# Patient Record
Sex: MALE | Race: BLACK OR AFRICAN AMERICAN | URBAN - METROPOLITAN AREA
[De-identification: names, ages, dates, MRNs, and addresses within clinical notes are randomized per-mention and may not be internally consistent; named-entity substitution may affect disease eponyms.]

---

## 2017-01-18 ENCOUNTER — ANTICOAGULATION THERAPY VISIT (OUTPATIENT)
Dept: NURSING | Facility: CLINIC | Age: 62
End: 2017-01-18
Payer: COMMERCIAL

## 2017-01-18 ENCOUNTER — OFFICE VISIT (OUTPATIENT)
Dept: GASTROENTEROLOGY | Facility: CLINIC | Age: 62
End: 2017-01-18
Attending: PHYSICIAN ASSISTANT
Payer: COMMERCIAL

## 2017-01-18 VITALS
TEMPERATURE: 98.2 F | BODY MASS INDEX: 32.95 KG/M2 | OXYGEN SATURATION: 95 % | DIASTOLIC BLOOD PRESSURE: 91 MMHG | HEART RATE: 77 BPM | SYSTOLIC BLOOD PRESSURE: 145 MMHG | WEIGHT: 243.3 LBS | HEIGHT: 72 IN

## 2017-01-18 DIAGNOSIS — B18.2 CHRONIC HEPATITIS C WITHOUT HEPATIC COMA (H): ICD-10-CM

## 2017-01-18 DIAGNOSIS — I48.91 ATRIAL FIBRILLATION, UNSPECIFIED TYPE (H): ICD-10-CM

## 2017-01-18 DIAGNOSIS — Z79.01 LONG-TERM (CURRENT) USE OF ANTICOAGULANTS: ICD-10-CM

## 2017-01-18 DIAGNOSIS — B18.2 CHRONIC HEPATITIS C WITHOUT HEPATIC COMA (H): Primary | ICD-10-CM

## 2017-01-18 LAB
ALBUMIN SERPL-MCNC: 3.6 G/DL (ref 3.4–5)
ALP SERPL-CCNC: 72 U/L (ref 40–150)
ALT SERPL W P-5'-P-CCNC: 81 U/L (ref 0–70)
ANION GAP SERPL CALCULATED.3IONS-SCNC: 8 MMOL/L (ref 3–14)
AST SERPL W P-5'-P-CCNC: 62 U/L (ref 0–45)
BILIRUB DIRECT SERPL-MCNC: 0.4 MG/DL (ref 0–0.2)
BILIRUB SERPL-MCNC: 1.4 MG/DL (ref 0.2–1.3)
BUN SERPL-MCNC: 12 MG/DL (ref 7–30)
CALCIUM SERPL-MCNC: 8.8 MG/DL (ref 8.5–10.1)
CHLORIDE SERPL-SCNC: 106 MMOL/L (ref 94–109)
CO2 SERPL-SCNC: 28 MMOL/L (ref 20–32)
CREAT SERPL-MCNC: 0.78 MG/DL (ref 0.66–1.25)
ERYTHROCYTE [DISTWIDTH] IN BLOOD BY AUTOMATED COUNT: 12 % (ref 10–15)
GFR SERPL CREATININE-BSD FRML MDRD: NORMAL ML/MIN/1.7M2
GLUCOSE SERPL-MCNC: 84 MG/DL (ref 70–99)
HCT VFR BLD AUTO: 46.5 % (ref 40–53)
HGB BLD-MCNC: 16.3 G/DL (ref 13.3–17.7)
INR POINT OF CARE: 1.3 (ref 2–3)
INR PPP: 1.24 (ref 0.86–1.14)
MCH RBC QN AUTO: 35.1 PG (ref 26.5–33)
MCHC RBC AUTO-ENTMCNC: 35.1 G/DL (ref 31.5–36.5)
MCV RBC AUTO: 100 FL (ref 78–100)
PLATELET # BLD AUTO: 221 10E9/L (ref 150–450)
POTASSIUM SERPL-SCNC: 3.9 MMOL/L (ref 3.4–5.3)
PROT SERPL-MCNC: 7.2 G/DL (ref 6.8–8.8)
RBC # BLD AUTO: 4.64 10E12/L (ref 4.4–5.9)
SODIUM SERPL-SCNC: 143 MMOL/L (ref 133–144)
WBC # BLD AUTO: 6.3 10E9/L (ref 4–11)

## 2017-01-18 PROCEDURE — 99207 ZZC NO CHARGE NURSE ONLY: CPT | Performed by: FAMILY MEDICINE

## 2017-01-18 PROCEDURE — 85610 PROTHROMBIN TIME: CPT | Performed by: FAMILY MEDICINE

## 2017-01-18 PROCEDURE — 87522 HEPATITIS C REVRS TRNSCRPJ: CPT | Performed by: FAMILY MEDICINE

## 2017-01-18 PROCEDURE — 80076 HEPATIC FUNCTION PANEL: CPT | Performed by: FAMILY MEDICINE

## 2017-01-18 PROCEDURE — 85610 PROTHROMBIN TIME: CPT | Mod: QW | Performed by: FAMILY MEDICINE

## 2017-01-18 PROCEDURE — 85027 COMPLETE CBC AUTOMATED: CPT | Performed by: FAMILY MEDICINE

## 2017-01-18 PROCEDURE — 99212 OFFICE O/P EST SF 10 MIN: CPT | Mod: ZF

## 2017-01-18 PROCEDURE — 36415 COLL VENOUS BLD VENIPUNCTURE: CPT | Performed by: FAMILY MEDICINE

## 2017-01-18 PROCEDURE — 80048 BASIC METABOLIC PNL TOTAL CA: CPT | Performed by: FAMILY MEDICINE

## 2017-01-18 ASSESSMENT — PAIN SCALES - GENERAL: PAINLEVEL: NO PAIN (0)

## 2017-01-18 NOTE — PROGRESS NOTES
Call to patient x2 without answer left lengthy message regarding his warfarin dosage.Asked that patient call back if receives message.  ANTICOAGULATION FOLLOW-UP CLINIC VISIT    Patient Name:  Maximus Josue  Date:  1/18/2017  Contact Type:  Telephone    SUBJECTIVE:     Patient Findings     Positives No Problem Findings           OBJECTIVE    INR   Date Value Ref Range Status   01/18/2017 1.24* 0.86 - 1.14 Final       ASSESSMENT / PLAN  INR assessment SUB    Recheck INR In: 2 WEEKS    INR Location Clinic      Anticoagulation Summary as of 1/18/2017     INR goal 2.0-3.0   Selected INR 1.3! (1/18/2017)   Maintenance plan 7.5 mg (5 mg x 1.5) on Mon, Fri; 5 mg (5 mg x 1) all other days   Full instructions 1/18: 10 mg; Otherwise 7.5 mg on Mon, Fri; 5 mg all other days   Weekly total 40 mg   Plan last modified Donna Fowler RN (8/25/2016)   Next INR check 2/1/2017   Target end date Indefinite    Indications   Atrial fibrillation (H) [I48.91]  Long-term (current) use of anticoagulants [Z79.01] [Z79.01]         Anticoagulation Episode Summary     INR check location [facility]    Preferred lab     Send INR reminders to [facility] INR/PROTIME    Comments       Anticoagulation Care Providers     Provider Role Specialty Phone number    Handy Hollis MD [facility] [phone]            See the Encounter Report to view Anticoagulation Flowsheet and Dosing Calendar (Go to Encounters tab in chart review, and find the Anticoagulation Therapy Visit)        Pia Breaux RN

## 2017-01-18 NOTE — Clinical Note
"1/18/2017       RE: Maximus Jsoue  [address]  [facility] [address]     Dear Colleague,    Thank you for referring your patient, Maximus Josue, to the [facility] HEPATOLOGY at [facility]. Please see a copy of my visit note below.    Division of Gastroenterology, Hepatology and Nutrition Department of Medicine     Assessment/Plan  Chronic Hepatitis C,  genotype 1b     AFIB    Maximus Josue has chronic hepatitis C, genotype 1b.  His labs today show the following:  ALT 81, AST 62, Cr 0.78, T. Bili 1.4(mainly indirect), Na 143, Albumin 3.6 and PLTs of 221 so he does have normal liver synthetic function.  He is on Coumadin, so the INR is elevated.  Of concern is compliance.  He has a 40% No Show rate and has not followed up for 2 years.  He says, \"I am very focused now.\"  I have ordered an MRI and MR elastography and he will schedule these.  I will see him back in 2 months to discuss the results and if he is complaint with his appointments, we will start the Hepatitis C treatment process. He has agreed to this plan.    Thank you for allowing me to participate in the care of this patient.  If you have any questions regarding this visit and plan of care, please do not hesitate to page me at [phone].    Roz Ge MS, PA-C  Division of Gastroenterology, Hepatology and Nutrition      HPI  Maximus Josue is a pleasant 61 year old -American male with chronic hepatitis C, genotype 1b.  I had last seen him in clinic 5 years ago.  He was supposed to have had imaging and follow-up at that time.  He does have a 40% No Show Rate.   He says he is ready to focus now on making his appointments.  He was diagnosed 5 years ago with HCV.  His risk factors for hepatitis C are: intranasal cocaine and incarceration in the 1970's.   He has  undergone a full  Evaluation for this through [facility].   He has had a liver biopsy through [facility] about 5 years ago and doesn't recall the " results.   He  is  treatment naive. He does still drink alcohol about 4 beers in a sitting a few times per week.  This patient also has the following significant past medical history:   Patient Active Problem List   Diagnosis     Hypertension goal BP (blood pressure) < 140/80     Atrial fibrillation (H)     Hyperlipidemia with target LDL less than 100     Long term current use of anticoagulant therapy     Long-term (current) use of anticoagulants [Z79.01]     ACP (advance care planning)     Nasal bleeding     Chronic hepatitis C without hepatic coma (H)   He feels very well today with no symptoms to report.    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        ROS:  Comprehensive review of systems is negative, unless otherwise noted above    History reviewed. No pertinent past medical history.    Current Outpatient Prescriptions   Medication Sig Dispense Refill     bisoprolol-hydrochlorothiazide (ZIAC) 10-6.25 MG per tablet Take 1 tablet by mouth daily 90 tablet 3     warfarin (COUMADIN) 5 MG tablet Take 1 tablet (5 mg) by mouth every other day (Patient taking differently: Take 5 mg by mouth every other day 7.5 mon, fri & 5 row) 90 tablet 3     warfarin (COUMADIN) 3 MG tablet Take 1 tablet (3 mg) by mouth every other day 15 tablet 0       No Known Allergies    Family History   Problem Relation Age of Onset     Breast Cancer Mother      Prostate Cancer Father      Cancer - colorectal Sister 52     Colon Cancer     Breast Cancer Sister      Breast Cancer Sister        Social History     Social History     Marital Status:      Spouse Name: N/A     Number of Children: N/A     Years of Education: N/A     Social History Main Topics     Smoking status: Never Smoker      Smokeless tobacco: Never Used     Alcohol Use: Yes      Comment: Quit drinking in January 2015     Drug Use: No     Sexual Activity:     Partners: Female     Other Topics Concern     Parent/Sibling W/ Cabg, Mi Or  Angioplasty Before 65f 55m? No     Social History Narrative       OBJECTIVE  Vitals: Blood pressure 145/91, pulse 77, temperature 98.2  F (36.8  C), temperature source Oral, height 1.829 m (6'), weight 110.36 kg (243 lb 4.8 oz), SpO2 95 %. Body mass index is 32.99 kg/(m^2).  Gen: No acute distress, obese  Eyes: Sclera anicteric  Respiratory: Clear to auscultation bilaterally, no overt wheezing or rales  CV: RRR without overt murmur  Abdomen:  Soft, nontender, nondistended, normal bowel sounds  Without appreciable hepatosplenomegaly or masses   Skin: No rash; no jaundice  Psych: Normal speech, normal affect    Recent Laboratory Test Results  Lab Results   Component Value Date    WBC 6.3 01/18/2017    HGB 16.3 01/18/2017    HCT 46.5 01/18/2017     01/18/2017    CHOL 172 06/10/2016    TRIG 106 06/10/2016    HDL 87 06/10/2016    ALT 81* 01/18/2017    AST 62* 01/18/2017     01/18/2017    BUN 12 01/18/2017    CO2 28 01/18/2017    TSH 1.95 01/24/2013    PSA 0.62 02/26/2015    INR 1.24* 01/18/2017       Again, thank you for allowing me to participate in the care of your patient.      Sincerely,    Roz Ge PA-C

## 2017-01-18 NOTE — PROGRESS NOTES
"Division of Gastroenterology, Hepatology and Nutrition Department of Medicine     Assessment/Plan  Chronic Hepatitis C,  genotype 1b     AFIB    Maximus Josue has chronic hepatitis C, genotype 1b.  His labs today show the following:  ALT 81, AST 62, Cr 0.78, T. Bili 1.4(mainly indirect), Na 143, Albumin 3.6 and PLTs of 221 so he does have normal liver synthetic function.  He is on Coumadin, so the INR is elevated.  Of concern is compliance.  He has a 40% No Show rate and has not followed up for 2 years.  He says, \"I am very focused now.\"  I have ordered an MRI and MR elastography and he will schedule these.  I will see him back in 2 months to discuss the results and if he is complaint with his appointments, we will start the Hepatitis C treatment process. He has agreed to this plan.    Thank you for allowing me to participate in the care of this patient.  If you have any questions regarding this visit and plan of care, please do not hesitate to page me at [phone].    Roz Ge MS, PA-C  Division of Gastroenterology, Hepatology and Nutrition      [facility] [place]  Maximus Josue is a pleasant 61 year old -American male with chronic hepatitis C, genotype 1b.  I had last seen him in clinic 5 years ago.  He was supposed to have had imaging and follow-up at that time.  He does have a 40% No Show Rate.   He says he is ready to focus now on making his appointments.  He was diagnosed 5 years ago with HCV.  His risk factors for hepatitis C are: intranasal cocaine and incarceration in the 1970's.   He has  undergone a full  Evaluation for this through [facility].   He has had a liver biopsy through [facility] about 5 years ago and doesn't recall the results.   He  is  treatment naive. He does still drink alcohol about 4 beers in a sitting a few times per week.  This patient also has the following significant past medical history:   Patient Active Problem List   Diagnosis     Hypertension goal BP (blood pressure) < 140/80     " Atrial fibrillation (H)     Hyperlipidemia with target LDL less than 100     Long term current use of anticoagulant therapy     Long-term (current) use of anticoagulants [Z79.01]     ACP (advance care planning)     Nasal bleeding     Chronic hepatitis C without hepatic coma (H)   He feels very well today with no symptoms to report.    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        ROS:  Comprehensive review of systems is negative, unless otherwise noted above    History reviewed. No pertinent past medical history.    Current Outpatient Prescriptions   Medication Sig Dispense Refill     bisoprolol-hydrochlorothiazide (ZIAC) 10-6.25 MG per tablet Take 1 tablet by mouth daily 90 tablet 3     warfarin (COUMADIN) 5 MG tablet Take 1 tablet (5 mg) by mouth every other day (Patient taking differently: Take 5 mg by mouth every other day 7.5 mon, fri & 5 row) 90 tablet 3     warfarin (COUMADIN) 3 MG tablet Take 1 tablet (3 mg) by mouth every other day 15 tablet 0       No Known Allergies    Family History   Problem Relation Age of Onset     Breast Cancer Mother      Prostate Cancer Father      Cancer - colorectal Sister 52     Colon Cancer     Breast Cancer Sister      Breast Cancer Sister        Social History     Social History     Marital Status:      Spouse Name: N/A     Number of Children: N/A     Years of Education: N/A     Social History Main Topics     Smoking status: Never Smoker      Smokeless tobacco: Never Used     Alcohol Use: Yes      Comment: Quit drinking in January 2015     Drug Use: No     Sexual Activity:     Partners: Female     Other Topics Concern     Parent/Sibling W/ Cabg, Mi Or Angioplasty Before 65f 55m? No     Social History Narrative       OBJECTIVE  Vitals: Blood pressure 145/91, pulse 77, temperature 98.2  F (36.8  C), temperature source Oral, height 1.829 m (6'), weight 110.36 kg (243 lb 4.8 oz), SpO2 95 %. Body mass index is 32.99 kg/(m^2).  Gen: No acute  distress, obese  Eyes: Sclera anicteric  Respiratory: Clear to auscultation bilaterally, no overt wheezing or rales  CV: RRR without overt murmur  Abdomen:  Soft, nontender, nondistended, normal bowel sounds  Without appreciable hepatosplenomegaly or masses   Skin: No rash; no jaundice  Psych: Normal speech, normal affect    Recent Laboratory Test Results  Lab Results   Component Value Date    WBC 6.3 01/18/2017    HGB 16.3 01/18/2017    HCT 46.5 01/18/2017     01/18/2017    CHOL 172 06/10/2016    TRIG 106 06/10/2016    HDL 87 06/10/2016    ALT 81* 01/18/2017    AST 62* 01/18/2017     01/18/2017    BUN 12 01/18/2017    CO2 28 01/18/2017    TSH 1.95 01/24/2013    PSA 0.62 02/26/2015    INR 1.24* 01/18/2017

## 2017-01-18 NOTE — NURSING NOTE
Chief Complaint   Patient presents with     Consult     Hepatitis C   Pt roomed, vitals, meds, and allergies reviewed with pt. Pt ready for provider.  Alberto Haider, CMA

## 2017-01-19 LAB
HCV RNA SERPL NAA+PROBE-ACNC: ABNORMAL [IU]/ML
HCV RNA SERPL NAA+PROBE-LOG IU: 6.4 LOG IU/ML

## 2017-03-07 ENCOUNTER — TELEPHONE (OUTPATIENT)
Dept: GASTROENTEROLOGY | Facility: CLINIC | Age: 62
End: 2017-03-07

## 2017-03-07 NOTE — TELEPHONE ENCOUNTER
Called patient and left a message to ask him to make the appointment with Roz one week after us and elastography.  Alberto Haider, CMA

## 2017-03-15 ENCOUNTER — ANTICOAGULATION THERAPY VISIT (OUTPATIENT)
Dept: NURSING | Facility: CLINIC | Age: 62
End: 2017-03-15
Payer: COMMERCIAL

## 2017-03-15 DIAGNOSIS — I48.91 ATRIAL FIBRILLATION, UNSPECIFIED TYPE (H): ICD-10-CM

## 2017-03-15 DIAGNOSIS — Z79.01 LONG-TERM (CURRENT) USE OF ANTICOAGULANTS: ICD-10-CM

## 2017-03-15 DIAGNOSIS — I48.91 ATRIAL FIBRILLATION (H): ICD-10-CM

## 2017-03-15 LAB
INR POINT OF CARE: 1.41 (ref 2–3)
INR PPP: 1.41 (ref 0.86–1.14)

## 2017-03-15 PROCEDURE — 99207 ZZC NO CHARGE NURSE ONLY: CPT | Performed by: FAMILY MEDICINE

## 2017-03-15 NOTE — PROGRESS NOTES
ANTICOAGULATION FOLLOW-UP CLINIC VISIT    Patient Name:  Maximus Josue  Date:  3/15/2017  Contact Type:  Telephone    SUBJECTIVE:     Patient Findings     Positives Other complaints, Unexplained INR or factor level change    Comments Unknown reason for patient's INR except noncompliance.Pt does not return calls does not come into INR clinic have no idea what he is taking and what his circumstances may be at this time.           OBJECTIVE    INR   Date Value Ref Range Status   03/15/2017 1.41 (H) 0.86 - 1.14 Final       ASSESSMENT / PLAN  INR assessment SUB    Recheck INR In: 2 WEEKS    INR Location Clinic      Anticoagulation Summary as of 3/15/2017     INR goal 2.0-3.0   Today's INR 1.41!   Maintenance plan 7.5 mg (5 mg x 1.5) on Mon, Fri; 5 mg (5 mg x 1) all other days   Full instructions 3/15: 7.5 mg; 3/16: 7.5 mg; Otherwise 7.5 mg on Mon, Fri; 5 mg all other days   Weekly total 40 mg   Plan last modified Donna Fowler RN (8/25/2016)   Next INR check 3/28/2017   Target end date Indefinite    Indications   Atrial fibrillation (H) [I48.91]  Long-term (current) use of anticoagulants [Z79.01] [Z79.01]         Anticoagulation Episode Summary     INR check location [facility]    Preferred lab     Send INR reminders to [facility] INR/PROTIME    Comments       Anticoagulation Care Providers     Provider Role Specialty Phone number    Handy Hollis MD [facility] [phone]            See the Encounter Report to view Anticoagulation Flowsheet and Dosing Calendar (Go to Encounters tab in chart review, and find the Anticoagulation Therapy Visit)        Pia Breaux RN

## 2017-03-15 NOTE — PROGRESS NOTES
Patient called back and was reinstructed on coumadin dosing and appointment scheduled for follow up of INR.

## 2017-03-15 NOTE — MR AVS SNAPSHOT
Maximus Josue   3/15/2017   Anticoagulation Therapy Visit    Description:  61 year old male   Provider:  Handy Hollis MD   Department:  Bx Nurse           INR as of 3/15/2017     Today's INR 1.41!      Anticoagulation Summary as of 3/15/2017     INR goal 2.0-3.0   Today's INR 1.41!   Full instructions 3/15: 7.5 mg; 3/16: 7.5 mg; Otherwise 7.5 mg on Mon, Fri; 5 mg all other days   Next INR check 3/28/2017    Indications   Atrial fibrillation (H) [I48.91]  Long-term (current) use of anticoagulants [Z79.01] [Z79.01]         Your next Anticoagulation Clinic appointment(s)     Mar 28, 2017  8:15 AM CDT   Anticoagulation Visit with [facility]   [facility]    [address] [place] [id number] [phone]              Contact Numbers     [facility]  Please call  [phone] to cancel and/or reschedule your appointment   The direct line to the anticoagulant nurse is [phone] on Monday, Wednesday, and Friday. On Thursday, the anticoagulant nurse can be reached directly at [phone].         March 2017 Details    Sun Mon Tue Wed Thu Fri Sat        1               2               3               4                 5               6               7               8               9               10               11                 12               13               14               15      7.5 mg   See details      16      7.5 mg         17      7.5 mg         18      5 mg           19      5 mg         20      7.5 mg         21      5 mg         22      5 mg         23      5 mg         24      7.5 mg         25      5 mg           26      5 mg         27      7.5 mg         28            29               30               31                 Date Details   03/15 This INR check       Date of next INR:  3/28/2017         How to take your warfarin dose     To take:  5 mg Take 1 of  the 5 mg tablets.    To take:  7.5 mg Take 1.5 of the 5 mg tablets.

## 2017-03-30 ENCOUNTER — TELEPHONE (OUTPATIENT)
Dept: NURSING | Facility: CLINIC | Age: 62
End: 2017-03-30

## 2017-03-30 DIAGNOSIS — Z79.01 LONG TERM CURRENT USE OF ANTICOAGULANT THERAPY: Primary | ICD-10-CM

## 2017-04-28 ENCOUNTER — TELEPHONE (OUTPATIENT)
Dept: NURSING | Facility: CLINIC | Age: 62
End: 2017-04-28

## 2017-04-28 NOTE — TELEPHONE ENCOUNTER
[facility] called and refill on his coumadin canceled.  Pt must have INR done before more refilld.

## 2017-05-08 ENCOUNTER — TELEPHONE (OUTPATIENT)
Dept: FAMILY MEDICINE | Facility: CLINIC | Age: 62
End: 2017-05-08

## 2017-05-08 ENCOUNTER — TELEPHONE (OUTPATIENT)
Dept: NURSING | Facility: CLINIC | Age: 62
End: 2017-05-08

## 2017-05-08 ENCOUNTER — ANTICOAGULATION THERAPY VISIT (OUTPATIENT)
Dept: NURSING | Facility: CLINIC | Age: 62
End: 2017-05-08
Payer: COMMERCIAL

## 2017-05-08 DIAGNOSIS — Z79.01 LONG-TERM (CURRENT) USE OF ANTICOAGULANTS: ICD-10-CM

## 2017-05-08 DIAGNOSIS — Z79.01 LONG TERM CURRENT USE OF ANTICOAGULANT THERAPY: ICD-10-CM

## 2017-05-08 DIAGNOSIS — Z79.01 LONG TERM (CURRENT) USE OF ANTICOAGULANTS: Primary | ICD-10-CM

## 2017-05-08 DIAGNOSIS — I48.91 ATRIAL FIBRILLATION, UNSPECIFIED TYPE (H): ICD-10-CM

## 2017-05-08 DIAGNOSIS — I48.91 ATRIAL FIBRILLATION (H): ICD-10-CM

## 2017-05-08 LAB — INR PPP: 1.18 (ref 0.86–1.14)

## 2017-05-08 PROCEDURE — 99207 ZZC NO CHARGE NURSE ONLY: CPT

## 2017-05-08 PROCEDURE — 85610 PROTHROMBIN TIME: CPT | Performed by: FAMILY MEDICINE

## 2017-05-08 PROCEDURE — 36415 COLL VENOUS BLD VENIPUNCTURE: CPT | Performed by: FAMILY MEDICINE

## 2017-05-08 RX ORDER — WARFARIN SODIUM 5 MG/1
TABLET ORAL
Qty: 90 TABLET | Refills: 2 | Status: SHIPPED | OUTPATIENT
Start: 2017-05-08 | End: 2017-11-10

## 2017-05-08 NOTE — TELEPHONE ENCOUNTER
Reason for Call:  Other     Detailed comments: patient needs his medication warfarin (COUMADIN) 3 MG tablet and HCTZ faxed over to the [facility].  And why his is on medication and they will work with him on his bill.  Fax number  account number is [phone]    Phone Number Patient can be reached at: Home number on file [phone] (home)    Best Time: any    Can we leave a detailed message on this number? YES    Call taken on 5/8/2017 at 8:34 AM by LACEY MUELLER

## 2017-05-08 NOTE — LETTER
[facility]  [address]  [facility] [facility] [id number]  [phone]                                                                                                           Maximus Josue  [facility] [facility] [address]    May 8, 2017          To [employer],      Maximus is under my care for Atrial Fib, hypertension and long term anticoagulant therapy.  He is currently taking warfarin and bisoprolol-hydrocholorthiazide.      Sincerely,    Handy Hollis Jr MD

## 2017-05-08 NOTE — TELEPHONE ENCOUNTER
Patient was called back, he is requesting a letter for [employer].  They will not turn off gas and will spread out the bills due to his health condition.     Per patient's request letter to read:    Patient is under a physician's care for Atrial Fibrillation, Hypertension, and long term use of an anticoagulant therapy.   He is currently taking warfarin & bisoprolol-hydrochlorothiazide.     Patient is requesting the letter to be faxed to [employer] [phone].

## 2017-05-08 NOTE — MR AVS SNAPSHOT
Maximus Josue   5/8/2017 2:15 PM   Anticoagulation Therapy Visit    Description:  61 year old male   Provider:  [facility]   Department:  Bx Nurse           INR as of 5/8/2017     Today's INR 1.18!      Anticoagulation Summary as of 5/8/2017     INR goal 2.0-3.0   Today's INR 1.18!   Full instructions 7.5 mg on Mon, Fri; 5 mg all other days   Next INR check 5/22/2017    Indications   Atrial fibrillation (H) [I48.91]  Long-term (current) use of anticoagulants [Z79.01] [Z79.01]         Description     Talked with pt and he insists that he is taking his coumadin.  Called in new RX.       Your next Anticoagulation Clinic appointment(s)     May 22, 2017 11:00 AM CDT   Anticoagulation Visit with  ANTICOAGULATION CLINIC   [facility] ([facility])    [address] [place] [id number] [phone]              Contact Numbers     [facility]  Please call  [phone] to cancel and/or reschedule your appointment   The direct line to the anticoagulant nurse is [phone] on Monday, Wednesday, and Friday. On Thursday, the anticoagulant nurse can be reached directly at [phone].         May 2017 Details    Sun Mon Tue Wed Thu Fri Sat      1               2               3               4               5               6                 7               8      7.5 mg   See details      9      5 mg         10      5 mg         11      5 mg         12      7.5 mg         13      5 mg           14      5 mg         15      7.5 mg         16      5 mg         17      5 mg         18      5 mg         19      7.5 mg         20      5 mg           21      5 mg         22            23               24               25               26               27                 28               29               30               31                   Date Details   05/08 This INR check       Date of next INR:  5/22/2017          How to take your warfarin dose     To take:  5 mg Take 1 of the 5 mg tablets.    To take:  7.5 mg Take 1.5 of the 5 mg tablets.

## 2017-05-08 NOTE — PROGRESS NOTES
ANTICOAGULATION FOLLOW-UP CLINIC VISIT    Patient Name:  Maximus Josue  Date:  5/8/2017  Contact Type:  Telephone    SUBJECTIVE:     Patient Findings     Positives No Problem Findings    Comments Missed doses - Insists he is taking his coumadin as scheduled             OBJECTIVE    INR   Date Value Ref Range Status   05/08/2017 1.18 (H) 0.86 - 1.14 Final       ASSESSMENT / PLAN  INR assessment SUB    Recheck INR In: 2 WEEKS    INR Location Outside lab      Anticoagulation Summary as of 5/8/2017     INR goal 2.0-3.0   Today's INR 1.18!   Maintenance plan 7.5 mg (5 mg x 1.5) on Mon, Fri; 5 mg (5 mg x 1) all other days   Full instructions 7.5 mg on Mon, Fri; 5 mg all other days   Weekly total 40 mg   Plan last modified Donna Fowler RN (8/25/2016)   Next INR check 5/22/2017   Target end date Indefinite    Indications   Atrial fibrillation (H) [I48.91]  Long-term (current) use of anticoagulants [Z79.01] [Z79.01]         Anticoagulation Episode Summary     INR check location [facility]    Preferred lab     Send INR reminders to [facility] INR/PROTIME    Comments       Anticoagulation Care Providers     Provider Role Specialty Phone number    Handy Hollis MD [facility] [phone]            See the Encounter Report to view Anticoagulation Flowsheet and Dosing Calendar (Go to Encounters tab in chart review, and find the Anticoagulation Therapy Visit)        Donna Fowler RN               ANTICOAGULATION FOLLOW-UP CLINIC VISIT    Patient Name:  Maximus Josue  Date:  5/8/2017  Contact Type:  Telephone    SUBJECTIVE:     Patient Findings     Positives No Problem Findings    Comments Missed doses - Insists he is taking his coumadin as scheduled             OBJECTIVE    INR   Date Value Ref Range Status   05/08/2017 1.18 (H) 0.86 - 1.14 Final       ASSESSMENT / PLAN  INR assessment SUB    Recheck INR In: 2 WEEKS    INR Location Outside lab      Anticoagulation Summary as of 5/8/2017      INR goal 2.0-3.0   Today's INR 1.18!   Maintenance plan 7.5 mg (5 mg x 1.5) on Mon, Fri; 5 mg (5 mg x 1) all other days   Full instructions 7.5 mg on Mon, Fri; 5 mg all other days   Weekly total 40 mg   Plan last modified Donna Fowler RN (8/25/2016)   Next INR check 5/22/2017   Target end date Indefinite    Indications   Atrial fibrillation (H) [I48.91]  Long-term (current) use of anticoagulants [Z79.01] [Z79.01]         Anticoagulation Episode Summary     INR check location [facility]    Preferred lab     Send INR reminders to [facility] INR/PROTIME    Comments       Anticoagulation Care Providers     Provider Role Specialty Phone number    Handy Hollis MD [facility] [phone]            See the Encounter Report to view Anticoagulation Flowsheet and Dosing Calendar (Go to Encounters tab in chart review, and find the Anticoagulation Therapy Visit)        Donna Fowler RN

## 2017-05-10 DIAGNOSIS — B18.2 CHRONIC HEPATITIS C WITHOUT HEPATIC COMA (H): Primary | ICD-10-CM

## 2017-05-11 ENCOUNTER — PRE VISIT (OUTPATIENT)
Dept: GASTROENTEROLOGY | Facility: CLINIC | Age: 62
End: 2017-05-11

## 2017-05-11 NOTE — TELEPHONE ENCOUNTER
Was the patient contacted by phone and reminded of the upcoming visit? Yes    Was the patient instructed to bring a current list of all medications to the appointment or instructed to bring in all medication bottles? Yes, patient verbalized understanding    Was the patient instructed to arrive prior to the appointment time to have ordered labs drawn? Yes, patient verbalized understanding    Were the needed lab orders placed? Yes    Oly Escobar CMA

## 2017-05-16 ENCOUNTER — ANTICOAGULATION THERAPY VISIT (OUTPATIENT)
Dept: NURSING | Facility: CLINIC | Age: 62
End: 2017-05-16

## 2017-05-16 DIAGNOSIS — Z79.01 LONG-TERM (CURRENT) USE OF ANTICOAGULANTS: ICD-10-CM

## 2017-05-16 DIAGNOSIS — I48.91 ATRIAL FIBRILLATION, UNSPECIFIED TYPE (H): ICD-10-CM

## 2017-05-16 DIAGNOSIS — I48.91 ATRIAL FIBRILLATION (H): ICD-10-CM

## 2017-05-16 DIAGNOSIS — B18.2 CHRONIC HEPATITIS C WITHOUT HEPATIC COMA (H): ICD-10-CM

## 2017-05-16 LAB
ALBUMIN SERPL-MCNC: 3.8 G/DL (ref 3.4–5)
ALP SERPL-CCNC: 100 U/L (ref 40–150)
ALT SERPL W P-5'-P-CCNC: 104 U/L (ref 0–70)
ANION GAP SERPL CALCULATED.3IONS-SCNC: 8 MMOL/L (ref 3–14)
AST SERPL W P-5'-P-CCNC: 92 U/L (ref 0–45)
BILIRUB DIRECT SERPL-MCNC: 0.3 MG/DL (ref 0–0.2)
BILIRUB SERPL-MCNC: 1 MG/DL (ref 0.2–1.3)
BUN SERPL-MCNC: 8 MG/DL (ref 7–30)
CALCIUM SERPL-MCNC: 9.3 MG/DL (ref 8.5–10.1)
CHLORIDE SERPL-SCNC: 103 MMOL/L (ref 94–109)
CO2 SERPL-SCNC: 28 MMOL/L (ref 20–32)
CREAT SERPL-MCNC: 0.73 MG/DL (ref 0.66–1.25)
ERYTHROCYTE [DISTWIDTH] IN BLOOD BY AUTOMATED COUNT: 12.2 % (ref 10–15)
GFR SERPL CREATININE-BSD FRML MDRD: NORMAL ML/MIN/1.7M2
GLUCOSE SERPL-MCNC: 93 MG/DL (ref 70–99)
HCT VFR BLD AUTO: 46 % (ref 40–53)
HGB BLD-MCNC: 16 G/DL (ref 13.3–17.7)
INR PPP: 3.54 (ref 2–3)
INR PPP: 3.57 (ref 0.86–1.14)
MCH RBC QN AUTO: 34.9 PG (ref 26.5–33)
MCHC RBC AUTO-ENTMCNC: 34.8 G/DL (ref 31.5–36.5)
MCV RBC AUTO: 100 FL (ref 78–100)
PLATELET # BLD AUTO: 162 10E9/L (ref 150–450)
POTASSIUM SERPL-SCNC: 4.4 MMOL/L (ref 3.4–5.3)
PROT SERPL-MCNC: 7.5 G/DL (ref 6.8–8.8)
RBC # BLD AUTO: 4.59 10E12/L (ref 4.4–5.9)
SODIUM SERPL-SCNC: 139 MMOL/L (ref 133–144)
WBC # BLD AUTO: 6 10E9/L (ref 4–11)

## 2017-05-16 PROCEDURE — 80076 HEPATIC FUNCTION PANEL: CPT | Performed by: FAMILY MEDICINE

## 2017-05-16 PROCEDURE — 80048 BASIC METABOLIC PNL TOTAL CA: CPT | Performed by: FAMILY MEDICINE

## 2017-05-16 PROCEDURE — 85027 COMPLETE CBC AUTOMATED: CPT | Performed by: FAMILY MEDICINE

## 2017-05-16 PROCEDURE — 85610 PROTHROMBIN TIME: CPT | Performed by: FAMILY MEDICINE

## 2017-05-16 PROCEDURE — 36415 COLL VENOUS BLD VENIPUNCTURE: CPT | Performed by: FAMILY MEDICINE

## 2017-05-16 NOTE — PROGRESS NOTES
ANTICOAGULATION FOLLOW-UP CLINIC VISIT    Patient Name:  Maximus Josue  Date:  5/16/2017  Contact Type:  Face to Face    SUBJECTIVE:     Patient Findings     Positives No Problem Findings           OBJECTIVE    INR   Date Value Ref Range Status   05/16/2017 3.57 (H) 0.86 - 1.14 Final       ASSESSMENT / PLAN  INR assessment SUPRA    Recheck INR In: 2 WEEKS    INR Location Clinic      Anticoagulation Summary as of 5/16/2017     INR goal 2.0-3.0   Today's INR 3.54!   Maintenance plan 7.5 mg (5 mg x 1.5) on Mon, Fri; 5 mg (5 mg x 1) all other days   Full instructions 5/16: 2.5 mg; Otherwise 7.5 mg on Mon, Fri; 5 mg all other days   Weekly total 40 mg   Plan last modified Donna Fowler RN (8/25/2016)   Next INR check 6/1/2017   Target end date Indefinite    Indications   Atrial fibrillation (H) [I48.91]  Long-term (current) use of anticoagulants [Z79.01] [Z79.01]         Anticoagulation Episode Summary     INR check location [facility]    Preferred lab     Send INR reminders to [facility] INR/PROTIME    Comments       Anticoagulation Care Providers     Provider Role Specialty Phone number    Handy Hollis MD [facility] [phone]            See the Encounter Report to view Anticoagulation Flowsheet and Dosing Calendar (Go to Encounters tab in chart review, and find the Anticoagulation Therapy Visit)        Pia Breaux RN

## 2017-05-16 NOTE — MR AVS SNAPSHOT
Maximus Josue   5/16/2017   Anticoagulation Therapy Visit    Description:  61 year old male   Provider:  Handy Hollis MD   Department:  Bm Nurse           INR as of 5/16/2017     Today's INR 3.54!      Anticoagulation Summary as of 5/16/2017     INR goal 2.0-3.0   Today's INR 3.54!   Full instructions 5/16: 2.5 mg; Otherwise 7.5 mg on Mon, Fri; 5 mg all other days   Next INR check 6/1/2017    Indications   Atrial fibrillation (H) [I48.91]  Long-term (current) use of anticoagulants [Z79.01] [Z79.01]         Your next Anticoagulation Clinic appointment(s)     May 22, 2017 11:00 AM CDT   Anticoagulation Visit with  ANTICOAGULATION CLINIC   [facility]    [address] [place] [address] [address]  [facility] [address]   [phone]            Jun 01, 2017  8:30 AM CDT   Anticoagulation Visit with  ANTICOAGULATION CLINIC   [facility]    [facility] [facility] [address]  [facility] [address] [phone]              Contact Numbers     [facility]  Please call  [phone] to cancel and/or reschedule your appointment   The direct line to the anticoagulant nurse is [phone] on Monday, Wednesday, and Friday. On Thursday, the anticoagulant nurse can be reached directly at [phone].         May 2017 Details    Sun Mon Tue Wed Thu Fri Sat      1               2               3               4               5               6                 7               8               9               10               11               12               13                 14               15               16      2.5 mg   See details      17      5 mg         18      5 mg         19      7.5 mg         20      5 mg           21      5 mg         22      7.5 mg         23      5 mg         24      5 mg         25      5 mg         26      7.5 mg          27      5 mg           28      5 mg         29      7.5 mg         30      5 mg         31      5 mg             Date Details   05/16 This INR check               How to take your warfarin dose     To take:  2.5 mg Take 0.5 of a 5 mg tablet.    To take:  5 mg Take 1 of the 5 mg tablets.    To take:  7.5 mg Take 1.5 of the 5 mg tablets.           June 2017 Details    Sun Mon Tue Wed Thu Fri Sat         1            2               3                 4               5               6               7               8               9               10                 11               12               13               14               15               16               17                 18               19               20               21               22               23               24                 25               26               27               28               29               30                 Date Details   No additional details    Date of next INR:  6/1/2017         How to take your warfarin dose     To take:  5 mg Take 1 of the 5 mg tablets.

## 2017-06-01 ENCOUNTER — TELEPHONE (OUTPATIENT)
Dept: NURSING | Facility: CLINIC | Age: 62
End: 2017-06-01

## 2017-06-01 NOTE — TELEPHONE ENCOUNTER
Phone message that Maximus missed his appt and would he please call and make another appt. If he can not make it on a Thursday to make a lab only and have it drawn in the lab

## 2017-06-23 ENCOUNTER — ANTICOAGULATION THERAPY VISIT (OUTPATIENT)
Dept: NURSING | Facility: CLINIC | Age: 62
End: 2017-06-23
Payer: COMMERCIAL

## 2017-06-23 DIAGNOSIS — I48.91 ATRIAL FIBRILLATION, UNSPECIFIED TYPE (H): ICD-10-CM

## 2017-06-23 DIAGNOSIS — Z79.01 LONG-TERM (CURRENT) USE OF ANTICOAGULANTS: ICD-10-CM

## 2017-06-23 DIAGNOSIS — I48.91 ATRIAL FIBRILLATION (H): ICD-10-CM

## 2017-06-23 LAB — INR PPP: 2.2 (ref 0.86–1.14)

## 2017-06-23 PROCEDURE — 85610 PROTHROMBIN TIME: CPT | Performed by: FAMILY MEDICINE

## 2017-06-23 PROCEDURE — 99207 ZZC NO CHARGE NURSE ONLY: CPT

## 2017-06-23 PROCEDURE — 36416 COLLJ CAPILLARY BLOOD SPEC: CPT | Performed by: FAMILY MEDICINE

## 2017-06-23 NOTE — PROGRESS NOTES
ANTICOAGULATION FOLLOW-UP CLINIC VISIT    Patient Name:  Maximus Josue  Date:  6/23/2017  Contact Type:  Telephone    SUBJECTIVE:     Patient Findings     Positives No Problem Findings           OBJECTIVE    INR   Date Value Ref Range Status   06/23/2017 2.20 (H) 0.86 - 1.14 Final       ASSESSMENT / PLAN  INR assessment THER    Recheck INR In: 4 WEEKS    INR Location Outside lab      Anticoagulation Summary as of 6/23/2017     INR goal 2.0-3.0   Today's INR    Maintenance plan 7.5 mg (5 mg x 1.5) on Mon, Fri; 5 mg (5 mg x 1) all other days   Full instructions 7.5 mg on Mon, Fri; 5 mg all other days   Weekly total 40 mg   Plan last modified Donna Fowler RN (8/25/2016)   Next INR check 7/20/2017   Target end date Indefinite    Indications   Atrial fibrillation (H) [I48.91]  Long-term (current) use of anticoagulants [Z79.01] [Z79.01]         Anticoagulation Episode Summary     INR check location [facility]    Preferred lab     Send INR reminders to [facility] INR/PROTIME    Comments       Anticoagulation Care Providers     Provider Role Specialty Phone number    Handy Hollis MD [facility] [phone]            See the Encounter Report to view Anticoagulation Flowsheet and Dosing Calendar (Go to Encounters tab in chart review, and find the Anticoagulation Therapy Visit)        Donna Fowler RN

## 2017-06-23 NOTE — MR AVS SNAPSHOT
Maximus Josue   6/23/2017 4:00 PM   Anticoagulation Therapy Visit    Description:  61 year old male   Provider:  [facility]   Department:  Bx Nurse           INR as of 6/23/2017     Today's INR 2.20      Anticoagulation Summary as of 6/23/2017     INR goal 2.0-3.0   Today's INR 2.20   Full instructions 7.5 mg on Mon, Fri; 5 mg all other days   Next INR check 7/20/2017    Indications   Atrial fibrillation (H) [I48.91]  Long-term (current) use of anticoagulants [Z79.01] [Z79.01]         Description     Dosing called to Maximus      Your next Anticoagulation Clinic appointment(s)     Jun 23, 2017  4:00 PM CDT   Anticoagulation Visit with  ANTICOAGULATION CLINIC   [facility] ([facility])    [address] [place] [address] [address]  [facility] [id number] [phone]            Jul 20, 2017  8:15 AM CDT   Anticoagulation Visit with  ANTICOAGULATION CLINIC   [facility]    [facility] [facility] [address]  [facility] [address] [phone]              Contact Numbers     [facility]  Please call  [phone] to cancel and/or reschedule your appointment   The direct line to the anticoagulant nurse is [phone] on Monday, Wednesday, and Friday. On Thursday, the anticoagulant nurse can be reached directly at [phone].         June 2017 Details    Sun Mon Tue Wed Thu Fri Sat         1               2               3                 4               5               6               7               8               9               10                 11               12               13               14               15               16               17                 18               19               20               21               22               23      7.5 mg   See details      24      5 mg           25      5 mg         26      7.5 mg          27      5 mg         28      5 mg         29      5 mg         30      7.5 mg           Date Details   06/23 This INR check               How to take your warfarin dose     To take:  5 mg Take 1 of the 5 mg tablets.    To take:  7.5 mg Take 1.5 of the 5 mg tablets.           July 2017 Details    Sun Mon Tue Wed Thu Fri Sat           1      5 mg           2      5 mg         3      7.5 mg         4      5 mg         5      5 mg         6      5 mg         7      7.5 mg         8      5 mg           9      5 mg         10      7.5 mg         11      5 mg         12      5 mg         13      5 mg         14      7.5 mg         15      5 mg           16      5 mg         17      7.5 mg         18      5 mg         19      5 mg         20            21               22                 23               24               25               26               27               28               29                 30               31                     Date Details   No additional details    Date of next INR:  7/20/2017         How to take your warfarin dose     To take:  5 mg Take 1 of the 5 mg tablets.    To take:  7.5 mg Take 1.5 of the 5 mg tablets.

## 2017-08-30 ENCOUNTER — TELEPHONE (OUTPATIENT)
Dept: NURSING | Facility: CLINIC | Age: 62
End: 2017-08-30

## 2017-09-20 ENCOUNTER — TELEPHONE (OUTPATIENT)
Dept: NURSING | Facility: CLINIC | Age: 62
End: 2017-09-20

## 2017-09-26 DIAGNOSIS — I48.91 ATRIAL FIBRILLATION, UNSPECIFIED TYPE (H): ICD-10-CM

## 2017-09-26 DIAGNOSIS — Z79.01 LONG-TERM (CURRENT) USE OF ANTICOAGULANTS: ICD-10-CM

## 2017-09-26 LAB — INR PPP: 1.52 (ref 0.86–1.14)

## 2017-09-26 PROCEDURE — 36415 COLL VENOUS BLD VENIPUNCTURE: CPT | Performed by: FAMILY MEDICINE

## 2017-09-26 PROCEDURE — 85610 PROTHROMBIN TIME: CPT | Performed by: FAMILY MEDICINE

## 2017-09-27 ENCOUNTER — ANTICOAGULATION THERAPY VISIT (OUTPATIENT)
Dept: NURSING | Facility: CLINIC | Age: 62
End: 2017-09-27
Payer: COMMERCIAL

## 2017-09-27 DIAGNOSIS — I48.91 ATRIAL FIBRILLATION (H): ICD-10-CM

## 2017-09-27 DIAGNOSIS — Z79.01 LONG-TERM (CURRENT) USE OF ANTICOAGULANTS: ICD-10-CM

## 2017-09-27 LAB — INR POINT OF CARE: 1.52 (ref 2–3)

## 2017-09-27 PROCEDURE — 99207 ZZC NO CHARGE NURSE ONLY: CPT

## 2017-09-27 NOTE — PROGRESS NOTES
ANTICOAGULATION FOLLOW-UP CLINIC VISIT    Patient Name:  Maximus Josue  Date:  9/27/2017  Contact Type:  Telephone    SUBJECTIVE:     Patient Findings     Positives Unexplained INR or factor level change           OBJECTIVE    INR Protime   Date Value Ref Range Status   09/27/2017 1.52 (A) 2.0 - 3.0 Final       ASSESSMENT / PLAN  INR assessment SUB    Recheck INR In: 2 WEEKS    INR Location Clinic      Anticoagulation Summary as of 9/27/2017     INR goal 2.0-3.0   Today's INR 1.52!   Maintenance plan 7.5 mg (5 mg x 1.5) on Mon, Fri; 5 mg (5 mg x 1) all other days   Full instructions 9/27: 10 mg; Otherwise 7.5 mg on Mon, Fri; 5 mg all other days   Weekly total 40 mg   Plan last modified Donna Fowler RN (8/25/2016)   Next INR check 10/11/2017   Target end date Indefinite    Indications   Atrial fibrillation (H) [I48.91]  Long-term (current) use of anticoagulants [Z79.01] [Z79.01]         Anticoagulation Episode Summary     INR check location [facility]    Preferred lab     Send INR reminders to [facility] INR/PROTIME    Comments       Anticoagulation Care Providers     Provider Role Specialty Phone number    Handy Hollis MD [facility] [phone]            See the Encounter Report to view Anticoagulation Flowsheet and Dosing Calendar (Go to Encounters tab in chart review, and find the Anticoagulation Therapy Visit)        Pia Breaux RN

## 2017-09-27 NOTE — MR AVS SNAPSHOT
Maximus Josue   9/27/2017 4:00 PM   Anticoagulation Therapy Visit    Description:  62 year old male   Provider:   ANTICOAGULATION CLINIC   Department:  Bx Nurse           INR as of 9/27/2017     Today's INR 1.52!      Anticoagulation Summary as of 9/27/2017     INR goal 2.0-3.0   Today's INR 1.52!   Full instructions 9/27: 10 mg; Otherwise 7.5 mg on Mon, Fri; 5 mg all other days   Next INR check 10/11/2017    Indications   Atrial fibrillation (H) [I48.91]  Long-term (current) use of anticoagulants [Z79.01] [Z79.01]         Your next Anticoagulation Clinic appointment(s)     Sep 27, 2017  4:00 PM CDT   Anticoagulation Visit with  ANTICOAGULATION CLINIC   [facility]    [address] [place] [id number] [phone]            Oct 11, 2017  4:30 PM CDT   Anticoagulation Visit with  ANTICOAGULATION CLINIC   [facility] ([facility])    [address] [place] [id number] [phone]              Contact Numbers     [facility]  Please call  [phone] to cancel and/or reschedule your appointment   The direct line to the anticoagulant nurse is [phone] on Monday, Wednesday, and Friday. On Thursday, the anticoagulant nurse can be reached directly at [phone].         September 2017 Details    Sun Mon Tue Wed Thu Fri Sat          1               2                 3               4               5               6               7               8               9                 10               11               12               13               14               15               16                 17               18               19               20               21               22               23                 24               25               26               27      10 mg   See details      28      5  mg         29      7.5 mg         30      5 mg          Date Details   09/27 This INR check               How to take your warfarin dose     To take:  5 mg Take 1 of the 5 mg tablets.    To take:  7.5 mg Take 1.5 of the 5 mg tablets.    To take:  10 mg Take 2 of the 5 mg tablets.           October 2017 Details    Sun Mon Tue Wed Thu Fri Sat     1      5 mg         2      7.5 mg         3      5 mg         4      5 mg         5      5 mg         6      7.5 mg         7      5 mg           8      5 mg         9      7.5 mg         10      5 mg         11            12               13               14                 15               16               17               18               19               20               21                 22               23               24               25               26               27               28                 29               30               31                    Date Details   No additional details    Date of next INR:  10/11/2017         How to take your warfarin dose     To take:  5 mg Take 1 of the 5 mg tablets.    To take:  7.5 mg Take 1.5 of the 5 mg tablets.

## 2017-09-29 DIAGNOSIS — I10 HYPERTENSION GOAL BP (BLOOD PRESSURE) < 140/80: Chronic | ICD-10-CM

## 2017-10-02 RX ORDER — BISOPROLOL FUMARATE AND HYDROCHLOROTHIAZIDE 10; 6.25 MG/1; MG/1
TABLET ORAL
Qty: 30 TABLET | Refills: 2 | Status: SHIPPED | OUTPATIENT
Start: 2017-10-02 | End: 2018-01-15

## 2017-10-02 NOTE — TELEPHONE ENCOUNTER
Ziac      Last Written Prescription Date: 9-22-16  Last Fill Quantity: 90, # refills: 3  Last Office Visit with [facility] or [facility] prescribing provider: 12-20-16       Potassium   Date Value Ref Range Status   05/16/2017 4.4 3.4 - 5.3 mmol/L Final     Creatinine   Date Value Ref Range Status   05/16/2017 0.73 0.66 - 1.25 mg/dL Final     BP Readings from Last 3 Encounters:   01/18/17 (!) 145/91   12/20/16 114/70   06/10/16 128/80       Prescription approved per [facility] Refill Protocol.

## 2017-10-11 ENCOUNTER — TELEPHONE (OUTPATIENT)
Dept: NURSING | Facility: CLINIC | Age: 62
End: 2017-10-11

## 2017-10-11 NOTE — TELEPHONE ENCOUNTER
Left voice message for patient because we have not received an INR result today. No future OV seen on schedule. Patient to call back INR nurse.

## 2017-10-20 ENCOUNTER — TELEPHONE (OUTPATIENT)
Dept: NURSING | Facility: CLINIC | Age: 62
End: 2017-10-20

## 2017-10-27 ENCOUNTER — TELEPHONE (OUTPATIENT)
Dept: NURSING | Facility: CLINIC | Age: 62
End: 2017-10-27

## 2017-10-27 NOTE — LETTER
10/27/2017     Maximus Josue  [address] [facility] [address]      Dear Maximus:    This is a letter to remind you that you are over due  For an INR.  Please call [phone].  Thank you        Sincerely,  Dr Handy Hollis/pako GORVER      [address] [address]  Phone: [phone]  Fax: [phone]

## 2017-11-10 ENCOUNTER — ANTICOAGULATION THERAPY VISIT (OUTPATIENT)
Dept: NURSING | Facility: CLINIC | Age: 62
End: 2017-11-10
Payer: COMMERCIAL

## 2017-11-10 ENCOUNTER — OFFICE VISIT (OUTPATIENT)
Dept: FAMILY MEDICINE | Facility: CLINIC | Age: 62
End: 2017-11-10
Payer: COMMERCIAL

## 2017-11-10 VITALS
DIASTOLIC BLOOD PRESSURE: 96 MMHG | HEART RATE: 64 BPM | OXYGEN SATURATION: 100 % | TEMPERATURE: 98.2 F | RESPIRATION RATE: 16 BRPM | SYSTOLIC BLOOD PRESSURE: 150 MMHG | BODY MASS INDEX: 33.77 KG/M2 | WEIGHT: 249 LBS

## 2017-11-10 DIAGNOSIS — I10 HYPERTENSION GOAL BP (BLOOD PRESSURE) < 140/80: Primary | Chronic | ICD-10-CM

## 2017-11-10 DIAGNOSIS — Z79.01 LONG-TERM (CURRENT) USE OF ANTICOAGULANTS: ICD-10-CM

## 2017-11-10 DIAGNOSIS — B18.2 CHRONIC HEPATITIS C WITHOUT HEPATIC COMA (H): ICD-10-CM

## 2017-11-10 DIAGNOSIS — R04.2 HEMOPTYSIS: ICD-10-CM

## 2017-11-10 DIAGNOSIS — I48.91 ATRIAL FIBRILLATION (H): ICD-10-CM

## 2017-11-10 DIAGNOSIS — I48.20 CHRONIC ATRIAL FIBRILLATION (H): ICD-10-CM

## 2017-11-10 DIAGNOSIS — R06.83 SNORING: ICD-10-CM

## 2017-11-10 LAB — INR PPP: 1.28 (ref 0.86–1.14)

## 2017-11-10 PROCEDURE — 99207 ZZC NO CHARGE NURSE ONLY: CPT

## 2017-11-10 PROCEDURE — 36415 COLL VENOUS BLD VENIPUNCTURE: CPT | Performed by: FAMILY MEDICINE

## 2017-11-10 PROCEDURE — 85610 PROTHROMBIN TIME: CPT | Performed by: FAMILY MEDICINE

## 2017-11-10 PROCEDURE — 99214 OFFICE O/P EST MOD 30 MIN: CPT | Performed by: PHYSICIAN ASSISTANT

## 2017-11-10 RX ORDER — WARFARIN SODIUM 5 MG/1
TABLET ORAL
Qty: 90 TABLET | Refills: 2 | Status: SHIPPED | OUTPATIENT
Start: 2017-11-10 | End: 2018-03-30

## 2017-11-10 NOTE — PROGRESS NOTES
ANTICOAGULATION FOLLOW-UP CLINIC VISIT    Patient Name:  Maximus Josue  Date:  11/10/2017  Contact Type:  Telephone    SUBJECTIVE:     Patient Findings     Positives No Problem Findings           OBJECTIVE    INR   Date Value Ref Range Status   11/10/2017 1.28 (H) 0.86 - 1.14 Final       ASSESSMENT / PLAN  INR assessment SUB    Recheck INR In: 1 WEEK    INR Location Clinic      Anticoagulation Summary as of 11/10/2017     INR goal 2.0-3.0   Today's INR 1.28!   Maintenance plan 7.5 mg (5 mg x 1.5) on Mon, Fri; 5 mg (5 mg x 1) all other days   Full instructions 11/10: 10 mg; 11/11: 10 mg; Otherwise 7.5 mg on Mon, Fri; 5 mg all other days   Weekly total 40 mg   Plan last modified Donna Fowler RN (8/25/2016)   Next INR check 11/16/2017   Target end date Indefinite    Indications   Atrial fibrillation (H) [I48.91]  Long-term (current) use of anticoagulants [Z79.01] [Z79.01]         Anticoagulation Episode Summary     INR check location [facility]    Preferred lab     Send INR reminders to [facility] INR/PROTIME    Comments       Anticoagulation Care Providers     Provider Role Specialty Phone number    Handy Hollis MD [facility] [phone]            See the Encounter Report to view Anticoagulation Flowsheet and Dosing Calendar (Go to Encounters tab in chart review, and find the Anticoagulation Therapy Visit)        Donna Fowler RN

## 2017-11-10 NOTE — MR AVS SNAPSHOT
Maximus Josue   11/10/2017 2:45 PM   Anticoagulation Therapy Visit    Description:  62 year old male   Provider:  [facility]   Department:  Bx Nurse           INR as of 11/10/2017     Today's INR 1.28!      Anticoagulation Summary as of 11/10/2017     INR goal 2.0-3.0   Today's INR 1.28!   Full instructions 11/10: 10 mg; 11/11: 10 mg; Otherwise 7.5 mg on Mon, Fri; 5 mg all other days   Next INR check 11/16/2017    Indications   Atrial fibrillation (H) [I48.91]  Long-term (current) use of anticoagulants [Z79.01] [Z79.01]         Description     Dosing called to Maximus. He states that he has been taking 1.5 tab Monday and  1 tab rest of week faithfully.        Your next Anticoagulation Clinic appointment(s)     Nov 16, 2017  9:30 AM CST   Anticoagulation Visit with  ANTICOAGULATION CLINIC   [facility] ([facility])    [address] [place] [address] [phone]              Contact Numbers     [facility]  Please call  [phone] to cancel and/or reschedule your appointment   The direct line to the anticoagulant nurse is [phone] on Monday, Wednesday, and Friday. On Thursday, the anticoagulant nurse can be reached directly at [phone].         November 2017 Details    Sun Mon Tue Wed Thu Fri Sat        1               2               3               4                 5               6               7               8               9               10      10 mg   See details      11      10 mg           12      5 mg         13      7.5 mg         14      5 mg         15      5 mg         16            17               18                 19               20               21               22               23               24               25                 26               27               28               29               30                  Date Details   11/10 This INR check        Date of next INR:  11/16/2017         How to take your warfarin dose     To take:  5 mg Take 1 of the 5 mg tablets.    To take:  7.5 mg Take 1.5 of the 5 mg tablets.    To take:  10 mg Take 2 of the 5 mg tablets.

## 2017-11-10 NOTE — PROGRESS NOTES
SUBJECTIVE:   Maximus Josue is a 62 year old male who presents to clinic today for the following health issues:      Hypertension      Duration: yesterday    Description (location/character/radiation): pt was at the dentist and bp was elevated 160/? Dentist wouldn't pull tooth    Intensity:  moderate    Accompanying signs and symptoms: none    History (similar episodes/previous evaluation): None    Precipitating or alleviating factors: None    Therapies tried and outcome: None       HPI additional notes:   Chief Complaint   Patient presents with     Hypertension     Maximus presents today with hypertension. Needs dental work done, but referred back to PCP due to high BP. Patient states he ran out of medication several weeks ago, did not realize there were refills at pharmacy available, did not contact pharmacy.    Patient reports coughing up blood daily for several months. Was getting daily nosebleeds when it started, so thought it was due to that. However, hasn't had nosebleed in several months and still happens daily. Reports chronic tickle in throat, causing him to cough forcefully and brings up pink-tinged mucus. Sometimes contains streaks of BRB. Denies f/c/s, rhinorrhea, sinus pain or pressure, CP, SOB. Does note he has been told he snores quite loudly.     ROS:  Skin: negative  Eyes: negative  Ears/Nose/Throat: as above  Respiratory: as above  Cardiovascular: as above  Gastrointestinal: negative  Genitourinary: negative  Musculoskeletal: negative  Neurologic: negative  Psychiatric: negative  Hematologic/Lymphatic/Immunologic: negative  Endocrine: negative    Chart Review:  History   Smoking Status     Never Smoker   Smokeless Tobacco     Never Used       Patient Active Problem List   Diagnosis     Hypertension goal BP (blood pressure) < 140/80     Atrial fibrillation (H)     Hyperlipidemia with target LDL less than 100     Long term current use of anticoagulant therapy     Long-term (current) use of  anticoagulants [Z79.01]     ACP (advance care planning)     Nasal bleeding     Chronic hepatitis C without hepatic coma (H)     History reviewed. No pertinent surgical history.  Problem list, Medication list, Allergies, Medical/Social/Surg hx reviewed in [employer], updated as appropriate.   OBJECTIVE:                                                    BP (!) 150/96  Pulse 64  Temp 98.2  F (36.8  C)  Resp 16  Wt 249 lb (112.9 kg)  SpO2 100%  BMI 33.77 kg/m2  Body mass index is 33.77 kg/(m^2).  GENERAL:  WDWN, no acute distress  PSYCH: pleasant, cooperative  EYES: no discharge, no injection  HENT:  Normocephalic. Moist mucus membranes. Oropharynx pink, uvula midline.  NECK:  Supple, symmetric  LUNGS:  Clear to auscultation bilaterally without rhonchi, rales, or wheeze. Chest rise symmetric and no tenderness to palpation.  HEART:  Regular rate & rhythm. No murmur, gallop, or rub.  EXTREMITIES:  No gross deformities, moves all 4 limbs spontaneously  SKIN:  Warm and dry, no rash or suspicious lesions    NEUROLOGIC: alert, sensation grossly intact.    Diagnostic test results: none     ASSESSMENT/PLAN:                                                          ICD-10-CM    1. Hypertension goal BP (blood pressure) < 140/80 I10    2. Hemoptysis R04.2 OTOLARYNGOLOGY REFERRAL   3. Chronic atrial fibrillation (H) I48.2 warfarin (COUMADIN) 5 MG tablet     INR   4. Chronic hepatitis C without hepatic coma (H) B18.2    5. Long-term (current) use of anticoagulants Z79.01 warfarin (COUMADIN) 5 MG tablet     INR   6. Snoring R06.83 OTOLARYNGOLOGY REFERRAL     HTN poorly controlled due to patient non-compliance with medications. Instructed patient to refill Ziac, as he has been well-controlled on this in the past. Return in 1 week to recheck BP.    Benign exam in regards to hemoptysis. Patient does have known hepatitis C, however platelets wnl during last check 5/2017, low suspicion thrombocytopenia from hep C playing a role. Patient  prone to bleeding due to Coumadin use. Discussed nosebleeds can cause false hemoptysis, but no longer the culprit. Discussed likely has small tear during forceful coughing, resulting in pink-tinged mucus. Lungs CTA, so doubt true hemoptysis. Would be suspicious about possible chronic rhinitis as cause for frequent cough, but no obvious PND on exam today. Recommend further evaluation by ENT.    Please see patient instructions for treatment details.    Follow up in 1 week for BP recheck, sooner PRN.  Follow up with specialist as referred.    Otilia Aragon PA-C  [facility]

## 2017-11-10 NOTE — MR AVS SNAPSHOT
After Visit Summary   11/10/2017    Maximus Josue    MRN: [id number]           Patient Information     Date Of Birth          1955        Visit Information        Provider Department      11/10/2017 8:10 AM Otilia Aragon PA-C [facility]        Today's Diagnoses     Hemoptysis    -  1    Long-term (current) use of anticoagulants        Chronic atrial fibrillation (H)        Snoring           Follow-ups after your visit        Additional Services     OTOLARYNGOLOGY REFERRAL       Your provider has referred you to: [facility]: Adult Ear, Nose and Throat Clinic (Otolaryngology) [facility] [phone]  [email or web address]    Please be aware that coverage of these services is subject to the terms and limitations of your health insurance plan.  Call member services at your health plan with any benefit or coverage questions.      Please bring the following with you to your appointment:    (1) Any X-Rays, CTs or MRIs which have been performed.  Contact the facility where they were done to arrange for  prior to your scheduled appointment.   (2) List of current medications  (3) This referral request   (4) Any documents/labs given to you for this referral                  Your next 10 appointments already scheduled     Nov 10, 2017  8:10 AM CST   SHORT with Otilia Aragon PA-C   [facility] ([facility])    [address] [address] [phone]              Who to contact     If you have questions or need follow up information about today's clinic visit or your schedule please contact [facility] directly at [phone].  Normal or non-critical lab and imaging results will be communicated to you by MyChart, letter or phone within 4 business days after the clinic has received the  "results. If you do not hear from us within 7 days, please contact the clinic through [employer] or phone. If you have a critical or abnormal lab result, we will notify you by phone as soon as possible.  Submit refill requests through [employer] or call your pharmacy and they will forward the refill request to us. Please allow 3 business days for your refill to be completed.          Additional Information About Your Visit        [employer] Information     [employer] lets you send messages to your doctor, view your test results, renew your prescriptions, schedule appointments and more. To sign up, go to [email or web address] . Click on \"Log in\" on the left side of the screen, which will take you to the Welcome page. Then click on \"Sign up Now\" on the right side of the page.     You will be asked to enter the access code listed below, as well as some personal information. Please follow the directions to create your username and password.     Your access code is: [email or web address]  Expires: 2018  7:54 AM     Your access code will  in 90 days. If you need help or a new code, please call your [facility] clinic or [phone].        Care EveryWhere ID     This is your Care EveryWhere ID. This could be used by other organizations to access your [facility] medical records  [id number]        Your Vitals Were     Pulse Temperature Respirations Pulse Oximetry BMI (Body Mass Index)       64 98.2  F (36.8  C) 16 100% 33.77 kg/m2        Blood Pressure from Last 3 Encounters:   11/10/17 (!) 150/96   17 (!) 145/91   16 114/70    Weight from Last 3 Encounters:   11/10/17 249 lb (112.9 kg)   17 243 lb 4.8 oz (110.4 kg)   16 244 lb 3.2 oz (110.8 kg)              We Performed the Following     OTOLARYNGOLOGY REFERRAL          Today's Medication Changes          These changes are accurate as of: 11/10/17  7:54 AM.  If you have any questions, ask your nurse or doctor.               These medicines have changed or have " updated prescriptions.        Dose/Directions    warfarin 5 MG tablet   Commonly known as:  COUMADIN   This may have changed:  Another medication with the same name was removed. Continue taking this medication, and follow the directions you see here.   Used for:  Long-term (current) use of anticoagulants, Chronic atrial fibrillation (H)   Changed by:  Otilia Aragon PA-C        Take 1-2 tablets daily as directed by warfarin clinic..   Quantity:  90 tablet   Refills:  2            Where to get your medicines      These medications were sent to [id number] - [place], [place] - [address]  [address] [place] [address]     Phone:  [phone]     warfarin 5 MG tablet                Primary Care Provider Office Phone # Fax #    Handy Hollis [phone] [address] [facility] [address]        Equal Access to Services     DINO PHILIPPE : Hadii daisy manzano hadasho Soomaali, waaxda luqadaha, qaybta kaalmada adeegyada, vikas pickens . So [facility] [phone].    ATENCIÓN: Si habla español, tiene a deng disposición servicios gratuitos de asistencia lingüística. Estrellitaame al [phone].    We comply with applicable federal civil rights laws and [place] laws. We do not discriminate on the basis of race, color, national origin, age, disability, sex, sexual orientation, or gender identity.            Thank you!     Thank you for choosing [facility]  for your care. Our goal is always to provide you with excellent care. Hearing back from our patients is one way we can continue to improve our services. Please take a few minutes to complete the written survey that you may receive in the mail after your visit with us. Thank you!             Your Updated Medication List - Protect others around you: Learn how to safely use, store and throw away your medicines at www.disposemymeds.org.          This list is  accurate as of: 11/10/17  7:54 AM.  Always use your most recent med list.                   Brand Name Dispense Instructions for use Diagnosis    bisoprolol-hydrochlorothiazide 10-6.25 MG per tablet    ZIAC    30 tablet    TAKE 1 TABLET BY MOUTH DAILY.    Hypertension goal BP (blood pressure) < 140/80       warfarin 5 MG tablet    COUMADIN    90 tablet    Take 1-2 tablets daily as directed by warfarin clinic..    Long-term (current) use of anticoagulants, Chronic atrial fibrillation (H)

## 2017-11-10 NOTE — NURSING NOTE
Chief Complaint   Patient presents with     Hypertension       Initial BP (!) 150/96  Pulse 64  Temp 98.2  F (36.8  C)  Resp 16  Wt 249 lb (112.9 kg)  SpO2 100%  BMI 33.77 kg/m2 Estimated body mass index is 33.77 kg/(m^2) as calculated from the following:    Height as of 1/18/17: 6' (1.829 m).    Weight as of this encounter: 249 lb (112.9 kg).  Medication Reconciliation: dorys Murray CMA

## 2017-12-01 ENCOUNTER — TELEPHONE (OUTPATIENT)
Dept: NURSING | Facility: CLINIC | Age: 62
End: 2017-12-01

## 2017-12-06 ENCOUNTER — TELEPHONE (OUTPATIENT)
Dept: NURSING | Facility: CLINIC | Age: 62
End: 2017-12-06

## 2017-12-06 NOTE — TELEPHONE ENCOUNTER
Patient was called and informed he is overdue for an INR appointment, he is currently out of town and OV scheduled for next week.

## 2017-12-08 NOTE — TELEPHONE ENCOUNTER
APPT INFO    Date /Time: 12/21/17 at 11AM   Reason for Appt: Snoring, Hemoptysis   Ref Provider/Clinic: Otilia Aragon   Are there internal records? If yes, list: [facility]   Patient Contact (Y/N) & Call Details: No, referred   Action:

## 2017-12-14 ENCOUNTER — TELEPHONE (OUTPATIENT)
Dept: NURSING | Facility: CLINIC | Age: 62
End: 2017-12-14

## 2017-12-14 NOTE — TELEPHONE ENCOUNTER
Phone message left that pt misssed  INR appt today and to please call and schedule appt for next week

## 2017-12-21 ENCOUNTER — PRE VISIT (OUTPATIENT)
Dept: OTOLARYNGOLOGY | Facility: CLINIC | Age: 62
End: 2017-12-21

## 2017-12-27 ENCOUNTER — TELEPHONE (OUTPATIENT)
Dept: NURSING | Facility: CLINIC | Age: 62
End: 2017-12-27

## 2018-01-03 ENCOUNTER — TELEPHONE (OUTPATIENT)
Dept: NURSING | Facility: CLINIC | Age: 63
End: 2018-01-03

## 2018-01-15 DIAGNOSIS — Z79.01 LONG-TERM (CURRENT) USE OF ANTICOAGULANTS: ICD-10-CM

## 2018-01-15 DIAGNOSIS — I48.91 ATRIAL FIBRILLATION, UNSPECIFIED TYPE (H): ICD-10-CM

## 2018-01-15 DIAGNOSIS — I10 HYPERTENSION GOAL BP (BLOOD PRESSURE) < 140/80: Chronic | ICD-10-CM

## 2018-01-15 LAB — INR PPP: 1.4 (ref 0.86–1.14)

## 2018-01-15 PROCEDURE — 85610 PROTHROMBIN TIME: CPT | Performed by: FAMILY MEDICINE

## 2018-01-15 PROCEDURE — 36415 COLL VENOUS BLD VENIPUNCTURE: CPT | Performed by: FAMILY MEDICINE

## 2018-01-15 RX ORDER — BISOPROLOL FUMARATE AND HYDROCHLOROTHIAZIDE 10; 6.25 MG/1; MG/1
TABLET ORAL
Qty: 30 TABLET | Refills: 0 | Status: SHIPPED | OUTPATIENT
Start: 2018-01-15 | End: 2018-02-15

## 2018-01-15 NOTE — TELEPHONE ENCOUNTER
"11-10-17 OV- patient was to f/u in one week for BP check  Patient is out of medication      Requested Prescriptions   Pending Prescriptions Disp Refills     bisoprolol-hydrochlorothiazide (ZIAC) 10-6.25 MG per tablet 30 tablet 2    Beta-Blockers Protocol Failed    1/15/2018  9:11 AM       Failed - Blood pressure under 140/90    BP Readings from Last 3 Encounters:   11/10/17 (!) 150/96   01/18/17 (!) 145/91   12/20/16 114/70                Passed - Patient is age 6 or older       Passed - Recent or future visit with authorizing provider's specialty    Patient had office visit in the last year or has a visit in the next 30 days with authorizing provider.  See \"Patient Info\" tab in inbasket, or \"Choose Columns\" in Meds & Orders section of the refill encounter.               "

## 2018-01-15 NOTE — TELEPHONE ENCOUNTER
Reason for Call:  Medication or medication refill:    Do you use a [facility]?  Name of the pharmacy and phone number for the current request:  [facility]    Name of the medication requested: bisoprolol-hydrochlorothiazide (ZIAC) 10-6.25 MG per tablet    Other request: Patient states he is completely out and he would also like a 3 month supply.    Can we leave a detailed message on this number? YES    Phone number patient can be reached at: Home number on file [phone] (home)    Best Time:     Call taken on 1/15/2018 at 9:10 AM by YADIEL WEST

## 2018-01-16 ENCOUNTER — ANTICOAGULATION THERAPY VISIT (OUTPATIENT)
Dept: NURSING | Facility: CLINIC | Age: 63
End: 2018-01-16
Payer: COMMERCIAL

## 2018-01-16 LAB — INR PPP: 1.4

## 2018-01-16 PROCEDURE — 99207 ZZC NO CHARGE NURSE ONLY: CPT

## 2018-01-16 NOTE — PROGRESS NOTES
ANTICOAGULATION FOLLOW-UP CLINIC VISIT    Patient Name:  Maximus Josue  Date:  1/16/2018  Contact Type:  Telephone/ VM left for patient to call back     SUBJECTIVE:        OBJECTIVE    INR   Date Value Ref Range Status   01/15/2018 1.40 (H) 0.86 - 1.14 Final       ASSESSMENT / PLAN  INR assessment SUB    Recheck INR In: 1 WEEK    INR Location Clinic      Anticoagulation Summary as of 1/16/2018     INR goal 2.0-3.0   Today's INR    Maintenance plan 7.5 mg (5 mg x 1.5) on Mon, Fri; 5 mg (5 mg x 1) all other days   Full instructions 1/16: 7.5 mg; Otherwise 7.5 mg on Mon, Fri; 5 mg all other days   Weekly total 40 mg   Plan last modified Donna Fowler RN (8/25/2016)   Next INR check 1/24/2018   Target end date Indefinite    Indications   Atrial fibrillation (H) [I48.91]  Long-term (current) use of anticoagulants [Z79.01] [Z79.01]         Anticoagulation Episode Summary     INR check location [facility]    Preferred lab     Send INR reminders to [facility] INR/PROTIME    Comments       Anticoagulation Care Providers     Provider Role Specialty Phone number    DagobertoarielleHandy MD [facility] [phone]            See the Encounter Report to view Anticoagulation Flowsheet and Dosing Calendar (Go to Encounters tab in chart review, and find the Anticoagulation Therapy Visit)    Dosage adjustment made based on physician directed care plan. Patient has been mostly low on INRs lately, not sure if he has missed any coumadin pills or needs a permanent dosing change. Will wait for patient to call back.     Kiley Bee RN               ANTICOAGULATION FOLLOW-UP CLINIC VISIT    Patient Name:  Maximus Josue  Date:  1/16/2018  Contact Type:  Telephone/ Patient called back.     SUBJECTIVE:     Patient Findings     Positives Other complaints (coughing up some mucus with some blood in it for months ), No Problem Findings           OBJECTIVE    INR   Date Value Ref Range Status   01/15/2018 1.40  (H) 0.86 - 1.14 Final       ASSESSMENT / PLAN  INR assessment SUB    Recheck INR In: 1 WEEK    INR Location Clinic      Anticoagulation Summary as of 1/16/2018     INR goal 2.0-3.0   Today's INR    Maintenance plan 7.5 mg (5 mg x 1.5) on Mon, Fri; 5 mg (5 mg x 1) all other days   Full instructions 1/17: 7.5 mg; 1/24: 7.5 mg; 1/31: 7.5 mg; Otherwise 7.5 mg on Mon, Fri; 5 mg all other days   Weekly total 40 mg   Plan last modified Donna Fowler RN (8/25/2016)   Next INR check 2/1/2018   Target end date Indefinite    Indications   Atrial fibrillation (H) [I48.91]  Long-term (current) use of anticoagulants [Z79.01] [Z79.01]         Anticoagulation Episode Summary     INR check location [facility]    Preferred lab     Send INR reminders to [facility] INR/PROTIME    Comments       Anticoagulation Care Providers     Provider Role Specialty Phone number    Handy Hollis MD [facility] [phone]            See the Encounter Report to view Anticoagulation Flowsheet and Dosing Calendar (Go to Encounters tab in chart review, and find the Anticoagulation Therapy Visit)    Dosage adjustment made based on physician directed care plan. Will try adding 7.5 mg to Wednesdays for 2 weeks and recheck INR at  on Feb. 1st. Patient requested a reminder phone call 1 day before.     Kiley Bee RN

## 2018-02-09 ENCOUNTER — TELEPHONE (OUTPATIENT)
Dept: NURSING | Facility: CLINIC | Age: 63
End: 2018-02-09

## 2018-02-15 DIAGNOSIS — Z79.01 LONG-TERM (CURRENT) USE OF ANTICOAGULANTS: ICD-10-CM

## 2018-02-15 DIAGNOSIS — I48.91 ATRIAL FIBRILLATION, UNSPECIFIED TYPE (H): ICD-10-CM

## 2018-02-15 DIAGNOSIS — I10 HYPERTENSION GOAL BP (BLOOD PRESSURE) < 140/80: Chronic | ICD-10-CM

## 2018-02-15 LAB — INR PPP: 2.18 (ref 0.86–1.14)

## 2018-02-15 PROCEDURE — 36415 COLL VENOUS BLD VENIPUNCTURE: CPT | Performed by: FAMILY MEDICINE

## 2018-02-15 PROCEDURE — 85610 PROTHROMBIN TIME: CPT | Performed by: FAMILY MEDICINE

## 2018-02-15 RX ORDER — BISOPROLOL FUMARATE AND HYDROCHLOROTHIAZIDE 10; 6.25 MG/1; MG/1
TABLET ORAL
Qty: 30 TABLET | Refills: 0 | Status: SHIPPED | OUTPATIENT
Start: 2018-02-15 | End: 2018-03-30

## 2018-02-15 NOTE — TELEPHONE ENCOUNTER
Medication is being filled for 1 time refill only due to:  Patient needs to be seen because Needs medication and b/p follow up..

## 2018-02-15 NOTE — TELEPHONE ENCOUNTER
"Requested Prescriptions   Pending Prescriptions Disp Refills     bisoprolol-hydrochlorothiazide (ZIAC) 10-6.25 MG per tablet [Pharmacy Med Name: Bisoprolol-Hydrochlorothiazide Oral Tablet 10-6.25 MG]  Last Written Prescription Date:  1/15/2018  Last Fill Quantity: 30 tablet,  # refills: 0   Last Office Visit  11/10/2017        with  [facility], [facility] or [facility] prescribing provider:     Future Office Visit:    30 tablet 0     Sig: TAKE 1 TABLET BY MOUTH DAILY.    Beta-Blockers Protocol Failed    2/15/2018  3:53 PM       Failed - Blood pressure under 140/90 in past 12 months    BP Readings from Last 3 Encounters:   11/10/17 (!) 150/96   01/18/17 (!) 145/91   12/20/16 114/70          Passed - Patient is age 6 or older       Passed - Recent or future visit with authorizing provider's specialty    Patient had office visit in the last year or has a visit in the next 30 days with authorizing provider.  See \"Patient Info\" tab in inbasket, or \"Choose Columns\" in Meds & Orders section of the refill encounter.               "

## 2018-02-16 ENCOUNTER — TELEPHONE (OUTPATIENT)
Dept: FAMILY MEDICINE | Facility: CLINIC | Age: 63
End: 2018-02-16

## 2018-02-16 ENCOUNTER — ANTICOAGULATION THERAPY VISIT (OUTPATIENT)
Dept: NURSING | Facility: CLINIC | Age: 63
End: 2018-02-16

## 2018-02-16 DIAGNOSIS — I48.91 ATRIAL FIBRILLATION (H): ICD-10-CM

## 2018-02-16 DIAGNOSIS — I10 HYPERTENSION GOAL BP (BLOOD PRESSURE) < 140/80: Chronic | ICD-10-CM

## 2018-02-16 DIAGNOSIS — Z79.01 LONG-TERM (CURRENT) USE OF ANTICOAGULANTS: ICD-10-CM

## 2018-02-16 PROCEDURE — 99207 ZZC NO CHARGE NURSE ONLY: CPT

## 2018-02-16 RX ORDER — BISOPROLOL FUMARATE AND HYDROCHLOROTHIAZIDE 10; 6.25 MG/1; MG/1
TABLET ORAL
Qty: 30 TABLET | Refills: 0 | Status: CANCELLED | OUTPATIENT
Start: 2018-02-16

## 2018-02-16 NOTE — TELEPHONE ENCOUNTER
Duplicate message see telephone encounter for re: medication question. Rx was already approved 02/15/2017.

## 2018-02-16 NOTE — PROGRESS NOTES
ANTICOAGULATION FOLLOW-UP CLINIC VISIT    Patient Name:  Maximus Josue  Date:  2/16/2018  Contact Type:  Telephone    SUBJECTIVE:     Patient Findings     Positives No Problem Findings           OBJECTIVE    INR   Date Value Ref Range Status   02/15/2018 2.18 (H) 0.86 - 1.14 Final       ASSESSMENT / PLAN  INR assessment THER    Recheck INR In: 6 WEEKS    INR Location Outside lab      Anticoagulation Summary as of 2/16/2018     INR goal 2.0-3.0   Today's INR 2.18 (2/15/2018)   Maintenance plan 7.5 mg (5 mg x 1.5) on Mon, Fri; 5 mg (5 mg x 1) all other days   Full instructions 7.5 mg on Mon, Fri; 5 mg all other days   Weekly total 40 mg   No change documented Donna Fowler RN   Plan last modified Donna Fowler RN (8/25/2016)   Next INR check 3/30/2018   Target end date Indefinite    Indications   Atrial fibrillation (H) [I48.91]  Long-term (current) use of anticoagulants [Z79.01] [Z79.01]         Anticoagulation Episode Summary     INR check location [facility]    Preferred lab     Send INR reminders to [facility] INR/PROTIME    Comments       Anticoagulation Care Providers     Provider Role Specialty Phone number    Handy Hollis MD [facility] [phone]            See the Encounter Report to view Anticoagulation Flowsheet and Dosing Calendar (Go to Encounters tab in chart review, and find the Anticoagulation Therapy Visit)        Donna Fowler, RN

## 2018-02-16 NOTE — TELEPHONE ENCOUNTER
Reason for Call:  Medication or medication refill:    Do you use a [facility]?  Name of the pharmacy and phone number for the current request:  [facility]    Name of the medication requested: bisoprolol-hydrochlorothiazide (ZIAC) 10-6.25 MG per tablet    Other request: states is all out and insurance not currently active so will pay cash for med-wants 30 days worth.states gets very anxious and sob when does not take his med    Can we leave a detailed message on this number? YES    Phone number patient can be reached at: Home number on file [phone] (home)    Best Time: asap    Call taken on 2/16/2018 at 1:38 PM by URSULA RENE

## 2018-02-16 NOTE — TELEPHONE ENCOUNTER
Patient called reporting he needs Ziac refill. His insurance changed and will needs to pay out of pocket. Informed pt of [place] Prescription Assistance Program: [phone]. He may also try using good Rx. Script was approved for 30 days.

## 2018-02-16 NOTE — MR AVS SNAPSHOT
Maximus Josue   2/16/2018 3:30 PM   Anticoagulation Therapy Visit    Description:  62 year old male   Provider:   ANTICOAGULATION CLINIC   Department:  Bx Nurse           INR as of 2/16/2018     Today's INR 2.18 (2/15/2018)      Anticoagulation Summary as of 2/16/2018     INR goal 2.0-3.0   Today's INR 2.18 (2/15/2018)   Full instructions 7.5 mg on Mon, Fri; 5 mg all other days   Next INR check 3/30/2018    Indications   Atrial fibrillation (H) [I48.91]  Long-term (current) use of anticoagulants [Z79.01] [Z79.01]         Description     Dosing left as phone message for Maximus      Your next Anticoagulation Clinic appointment(s)     Feb 16, 2018  3:30 PM CST   Anticoagulation Visit with  ANTICOAGULATION CLINIC   [facility] ([facility])    [address] [place] [address]   [phone]            Mar 30, 2018  8:30 AM CDT   Anticoagulation Visit with  ANTICOAGULATION CLINIC   [facility] ([facility])    [address] [place] [address]   [phone]              Contact Numbers     [facility]  Please call  [phone] to cancel and/or reschedule your appointment   The direct line to the anticoagulant nurse is [phone] on Monday, Wednesday, and Friday. On Thursday, the anticoagulant nurse can be reached directly at [phone].         February 2018 Details    Sun Mon Tue Wed Thu Fri Sat         1               2               3                 4               5               6               7               8               9               10                 11               12               13               14               15               16      7.5 mg   See details      17      5 mg           18      5 mg         19      7.5 mg         20      5 mg         21      5 mg         22      5 mg         23      7.5  mg         24      5 mg           25      5 mg         26      7.5 mg         27      5 mg         28      5 mg             Date Details   02/16 This INR check               How to take your warfarin dose     To take:  5 mg Take 1 of the 5 mg tablets.    To take:  7.5 mg Take 1.5 of the 5 mg tablets.           March 2018 Details    Sun Mon Tue Wed Thu Fri Sat         1      5 mg         2      7.5 mg         3      5 mg           4      5 mg         5      7.5 mg         6      5 mg         7      5 mg         8      5 mg         9      7.5 mg         10      5 mg           11      5 mg         12      7.5 mg         13      5 mg         14      5 mg         15      5 mg         16      7.5 mg         17      5 mg           18      5 mg         19      7.5 mg         20      5 mg         21      5 mg         22      5 mg         23      7.5 mg         24      5 mg           25      5 mg         26      7.5 mg         27      5 mg         28      5 mg         29      5 mg         30            31                Date Details   No additional details    Date of next INR:  3/30/2018         How to take your warfarin dose     To take:  5 mg Take 1 of the 5 mg tablets.    To take:  7.5 mg Take 1.5 of the 5 mg tablets.

## 2018-03-30 DIAGNOSIS — I48.20 CHRONIC ATRIAL FIBRILLATION (H): ICD-10-CM

## 2018-03-30 DIAGNOSIS — Z79.01 LONG-TERM (CURRENT) USE OF ANTICOAGULANTS: ICD-10-CM

## 2018-03-30 DIAGNOSIS — I10 HYPERTENSION GOAL BP (BLOOD PRESSURE) < 140/80: Chronic | ICD-10-CM

## 2018-03-30 RX ORDER — BISOPROLOL FUMARATE AND HYDROCHLOROTHIAZIDE 10; 6.25 MG/1; MG/1
TABLET ORAL
Qty: 30 TABLET | Refills: 0 | Status: SHIPPED | OUTPATIENT
Start: 2018-03-30 | End: 2018-08-31

## 2018-03-30 RX ORDER — WARFARIN SODIUM 5 MG/1
TABLET ORAL
Qty: 60 TABLET | Refills: 0 | Status: SHIPPED | OUTPATIENT
Start: 2018-03-30 | End: 2018-08-23

## 2018-03-30 NOTE — TELEPHONE ENCOUNTER
Pt is overdue for OV. He was to follow up 1 week after 11/10/2017 appointment for BP check. He reports  been off coumadin because he ran out 3-4 days ago and doen't have insurance. Explained INR appointment needed for dosing. He asked about cost for INR visit. Pt was given consumer price line for estimate on cost. Message will be routed to PCP for advice on Ziac refill. Message will also be sent to care coordinator to assist with resource information.     Routing refill request to provider for review/approval because:  Alicia given x1 and patient did not follow up, please advise  BP above 140/90

## 2018-03-30 NOTE — TELEPHONE ENCOUNTER
Reason for Call:  Medication or medication refill:  Do you use a [facility]?  Name of the pharmacy and phone number for the current request:  [id number] - [address]  Name of the medication requested: bisoprolol-hydrochlorothiazide (ZIAC) 10-6.25 MG per tablet and warfarin (COUMADIN) 5 MG tablet  Other request: none  Can we leave a detailed message on this number? YES  Phone number patient can be reached at: Home number on file [phone] (home)  Best Time: any  Call taken on 3/30/2018 at 7:38 AM by MANISHA WADDELL

## 2018-03-30 NOTE — TELEPHONE ENCOUNTER
"Requested Prescriptions   Pending Prescriptions Disp Refills     bisoprolol-hydrochlorothiazide (ZIAC) 10-6.25 MG per tablet  Last Written Prescription Date:  2/15/2018  Last Fill Quantity: 30,  # refills: 0   Last office visit: 11/10/2017 with prescribing provider:  Otilia   Future Office Visit:     30 tablet 0    Beta-Blockers Protocol Failed    3/30/2018  7:41 AM       Failed - Blood pressure under 140/90 in past 12 months    BP Readings from Last 3 Encounters:   11/10/17 (!) 150/96   01/18/17 (!) 145/91   12/20/16 114/70                Passed - Patient is age 6 or older       Passed - Recent (12 mo) or future (30 days) visit within the authorizing provider's specialty    Patient had office visit in the last 12 months or has a visit in the next 30 days with authorizing provider or within the authorizing provider's specialty.  See \"Patient Info\" tab in inavoxsket, or \"Choose Columns\" in Meds & Orders section of the refill encounter.            warfarin (COUMADIN) 5 MG tablet  Last Written Prescription Date:  11/10/2017  Last Fill Quantity: 90,  # refills: 2   Last office visit: 11/10/2017 with prescribing provider:  Otilia   Future Office Visit:     90 tablet 2     Sig: Take 1-2 tablets daily as directed by warfarin clinic..    Vitamin K Antagonists Failed    3/30/2018  7:41 AM       Failed - INR is within goal in the past 6 weeks    Confirm INR is within goal in the past 6 weeks.     Recent Labs   Lab Test  02/15/18   1358   INR  2.18*                      Passed - Recent (12 mo) or future (30 days) visit within the authorizing provider's specialty    Patient had office visit in the last 12 months or has a visit in the next 30 days with authorizing provider or within the authorizing provider's specialty.  See \"Patient Info\" tab in inbasket, or \"Choose Columns\" in Meds & Orders section of the refill encounter.           Passed - Patient is 18 years of age or older          "

## 2018-04-05 ENCOUNTER — TELEPHONE (OUTPATIENT)
Dept: NURSING | Facility: CLINIC | Age: 63
End: 2018-04-05

## 2018-05-11 ENCOUNTER — TELEPHONE (OUTPATIENT)
Dept: NURSING | Facility: CLINIC | Age: 63
End: 2018-05-11

## 2018-05-11 NOTE — LETTER
Maximus,    You are overdue for your INR.  We are concerned because of the dangers of not having the levels of your anticoagulation known to see if you are in range.  Please call [phone] and make an appointment for an INR check.       Thank you,    Dr Gregory Hollis/  Fred GROVER

## 2018-06-08 ENCOUNTER — TELEPHONE (OUTPATIENT)
Dept: NURSING | Facility: CLINIC | Age: 63
End: 2018-06-08

## 2018-08-23 ENCOUNTER — TELEPHONE (OUTPATIENT)
Dept: NURSING | Facility: CLINIC | Age: 63
End: 2018-08-23

## 2018-08-23 DIAGNOSIS — I48.20 CHRONIC ATRIAL FIBRILLATION (H): ICD-10-CM

## 2018-08-23 DIAGNOSIS — Z79.01 LONG-TERM (CURRENT) USE OF ANTICOAGULANTS: ICD-10-CM

## 2018-08-23 RX ORDER — WARFARIN SODIUM 5 MG/1
TABLET ORAL
Qty: 20 TABLET | Refills: 0 | Status: SHIPPED | OUTPATIENT
Start: 2018-08-23

## 2018-08-23 NOTE — TELEPHONE ENCOUNTER
Pt has not had insurance an has not been taking his coumadin.  Today is birthday  And he gets to start medicare.  He said that he would restart his coumadin and then made and appt for him to get rechecked. [place] called in

## 2018-11-09 ENCOUNTER — TELEPHONE (OUTPATIENT)
Dept: NURSING | Facility: CLINIC | Age: 63
End: 2018-11-09

## 2019-02-14 ENCOUNTER — TELEPHONE (OUTPATIENT)
Dept: FAMILY MEDICINE | Facility: CLINIC | Age: 64
End: 2019-02-14

## 2019-04-11 ENCOUNTER — TELEPHONE (OUTPATIENT)
Dept: FAMILY MEDICINE | Facility: CLINIC | Age: 64
End: 2019-04-11

## 2019-06-17 ENCOUNTER — TELEPHONE (OUTPATIENT)
Dept: FAMILY MEDICINE | Facility: CLINIC | Age: 64
End: 2019-06-17

## 2019-06-17 NOTE — LETTER
[facility]  [address]  [address]  [place] [address] [phone]                                                                                                           Maximus Josue  [facility] [address] [facility] [address]    June 28, 2019      Dear Maximus,    PLEASE COME TO CLINIC FOR AN OFFICE VISIT   FOLLOW UP  CHRONIC ATRIAL FIBRILLATION    PUTS YOU AT RISK FOR STROKE OR MYOCARDIAL INFARCTION     Thank you for choosing [facility].  We appreciate the opportunity to serve you and look forward to supporting your healthcare needs in the future.    If you have any questions or concerns, please call me or my staff at [phone].      Sincerely,    Handy Hollis Jr MD

## 2019-06-17 NOTE — TELEPHONE ENCOUNTER
----- Message from Kaitlyn Whyte RN sent at 6/3/2019 11:40 AM CDT -----  2nd  reminder letter sent to patient.  Please check on follow up.

## 2019-06-17 NOTE — TELEPHONE ENCOUNTER
We have sent multiple letters and calls to your patient regarding the importance for monitoring.     As the referring MD please advise if attempts to contact the patient should be continued or discharge from anticoagulation services.     If patient is discharged from the [facility], further monitoring and dosing will need to be done by the PCP or we will need a new referral.  Please advise.

## 2019-06-21 NOTE — TELEPHONE ENCOUNTER
Left voice message asking pt to call triage back. Also called emergency contact Rosemary asking her to please have pt call triage back.

## 2019-06-25 NOTE — TELEPHONE ENCOUNTER
Patient Contact    Attempt # 2    Was call answered?  No.  Left message on voicemail with information to call back and schedule an appointment.    Has not been seen since 11/10/17.  Has not followed up with INR

## 2019-06-26 NOTE — TELEPHONE ENCOUNTER
Patient Contact     Attempt # 3     Was call answered?  No.  Left message on voicemail with information to call back and schedule an appointment.     Has not been seen since 11/10/17.  Has not followed up with INR       Can close encounter 6/27 if no call back from patient.

## 2019-06-28 ENCOUNTER — TELEPHONE (OUTPATIENT)
Dept: FAMILY MEDICINE | Facility: CLINIC | Age: 64
End: 2019-06-28

## 2019-06-28 NOTE — TELEPHONE ENCOUNTER
Panel Management Review      Patient has the following on his problem list:     Hypertension   Last three blood pressure readings:  BP Readings from Last 3 Encounters:   11/10/17 (!) 150/96   01/18/17 (!) 145/91   12/20/16 114/70     Blood pressure: FAILED    HTN Guidelines:  Less than 140/90      Composite cancer screening  Chart review shows that this patient is due/due soon for the following None  Summary:    Patient is due/failing the following:   PHYSICAL    Action needed:   Patient needs office visit for physical.    Type of outreach:    Sent letter.    Questions for provider review:    None                                                                                                                                    Gloria Garcia CMA
